# Patient Record
Sex: MALE | Race: WHITE | NOT HISPANIC OR LATINO | Employment: OTHER | RURAL
[De-identification: names, ages, dates, MRNs, and addresses within clinical notes are randomized per-mention and may not be internally consistent; named-entity substitution may affect disease eponyms.]

---

## 2017-05-10 ENCOUNTER — OFFICE VISIT (OUTPATIENT)
Dept: CARDIOLOGY | Facility: CLINIC | Age: 72
End: 2017-05-10

## 2017-05-10 VITALS
SYSTOLIC BLOOD PRESSURE: 148 MMHG | HEART RATE: 64 BPM | BODY MASS INDEX: 28.42 KG/M2 | HEIGHT: 71 IN | WEIGHT: 203 LBS | DIASTOLIC BLOOD PRESSURE: 78 MMHG

## 2017-05-10 DIAGNOSIS — E78.49 OTHER HYPERLIPIDEMIA: Primary | ICD-10-CM

## 2017-05-10 DIAGNOSIS — J44.9 COPD MIXED TYPE (HCC): ICD-10-CM

## 2017-05-10 DIAGNOSIS — I48.20 CHRONIC ATRIAL FIBRILLATION (HCC): ICD-10-CM

## 2017-05-10 DIAGNOSIS — I10 ESSENTIAL HYPERTENSION: ICD-10-CM

## 2017-05-10 DIAGNOSIS — Z72.0 TOBACCO ABUSE: ICD-10-CM

## 2017-05-10 PROBLEM — E78.5 HYPERLIPEMIA: Status: ACTIVE | Noted: 2017-05-10

## 2017-05-10 PROCEDURE — 93000 ELECTROCARDIOGRAM COMPLETE: CPT | Performed by: NURSE PRACTITIONER

## 2017-05-10 PROCEDURE — 99406 BEHAV CHNG SMOKING 3-10 MIN: CPT | Performed by: NURSE PRACTITIONER

## 2017-05-10 PROCEDURE — 99214 OFFICE O/P EST MOD 30 MIN: CPT | Performed by: NURSE PRACTITIONER

## 2017-05-10 RX ORDER — AMLODIPINE BESYLATE 5 MG/1
5 TABLET ORAL DAILY
COMMUNITY
End: 2020-01-01

## 2017-05-10 RX ORDER — ALFUZOSIN HYDROCHLORIDE 10 MG/1
10 TABLET, EXTENDED RELEASE ORAL DAILY
COMMUNITY
End: 2017-08-25

## 2017-05-10 RX ORDER — HYDROCHLOROTHIAZIDE 12.5 MG/1
12.5 TABLET ORAL DAILY
COMMUNITY
End: 2018-02-28 | Stop reason: ALTCHOICE

## 2017-05-10 RX ORDER — CHOLECALCIFEROL (VITAMIN D3) 50 MCG
2000 TABLET ORAL DAILY
COMMUNITY

## 2017-06-20 ENCOUNTER — TELEPHONE (OUTPATIENT)
Dept: CARDIOLOGY | Facility: CLINIC | Age: 72
End: 2017-06-20

## 2017-06-20 NOTE — TELEPHONE ENCOUNTER
Patient called stating it was mentioned to him at last office visit by Dr. Cook about scheduling a stress test either with [facility] or [facility] secondary to persisting SOA.  He seen you, but not sure if Dr. SUBRAMANIAN talked to them as they were leaving.  It looks like you did not have stress report at time of appt.    He had Lexiscan on 5/19/16-showed normal LV function, inferior, inferoseptal, and inferolateral wall infarct with karla-infarct ischemia.  Imdur added and cath if symptoms of SOB persist.  (The report is the cardiology tab 3rd report for 5/19/16...)    Does he need to repeat stress or does he need a cath?  He is still c/o SOA.

## 2017-06-21 NOTE — TELEPHONE ENCOUNTER
I spoke with Krista Payton.  Aware of recommendations.  Patient willing to proceed with cardiac catheterization.  Scheduled for 7/13/17.  Krista aware of patient's instructions including holding Xarelto 2 days prior to cath.

## 2017-07-13 ENCOUNTER — OUTSIDE FACILITY SERVICE (OUTPATIENT)
Dept: CARDIOLOGY | Facility: CLINIC | Age: 72
End: 2017-07-13

## 2017-07-13 PROCEDURE — 93459 L HRT ART/GRFT ANGIO: CPT | Performed by: INTERNAL MEDICINE

## 2017-08-25 ENCOUNTER — OFFICE VISIT (OUTPATIENT)
Dept: CARDIOLOGY | Facility: CLINIC | Age: 72
End: 2017-08-25

## 2017-08-25 VITALS
WEIGHT: 186 LBS | HEIGHT: 71 IN | DIASTOLIC BLOOD PRESSURE: 60 MMHG | HEART RATE: 40 BPM | BODY MASS INDEX: 26.04 KG/M2 | SYSTOLIC BLOOD PRESSURE: 90 MMHG

## 2017-08-25 DIAGNOSIS — R42 DIZZINESS: ICD-10-CM

## 2017-08-25 DIAGNOSIS — I25.10 CORONARY ARTERY DISEASE INVOLVING NATIVE CORONARY ARTERY OF NATIVE HEART WITHOUT ANGINA PECTORIS: Primary | ICD-10-CM

## 2017-08-25 DIAGNOSIS — I10 ESSENTIAL HYPERTENSION: ICD-10-CM

## 2017-08-25 DIAGNOSIS — I48.20 CHRONIC ATRIAL FIBRILLATION (HCC): ICD-10-CM

## 2017-08-25 DIAGNOSIS — J44.9 COPD MIXED TYPE (HCC): ICD-10-CM

## 2017-08-25 DIAGNOSIS — R53.83 OTHER FATIGUE: ICD-10-CM

## 2017-08-25 DIAGNOSIS — I48.0 PAF (PAROXYSMAL ATRIAL FIBRILLATION) (HCC): ICD-10-CM

## 2017-08-25 DIAGNOSIS — Z72.0 TOBACCO ABUSE: ICD-10-CM

## 2017-08-25 DIAGNOSIS — E78.49 OTHER HYPERLIPIDEMIA: ICD-10-CM

## 2017-08-25 PROCEDURE — 93000 ELECTROCARDIOGRAM COMPLETE: CPT | Performed by: NURSE PRACTITIONER

## 2017-08-25 PROCEDURE — 99406 BEHAV CHNG SMOKING 3-10 MIN: CPT | Performed by: NURSE PRACTITIONER

## 2017-08-25 PROCEDURE — 99214 OFFICE O/P EST MOD 30 MIN: CPT | Performed by: NURSE PRACTITIONER

## 2017-08-25 RX ORDER — FOLIC ACID 0.8 MG
TABLET ORAL DAILY
COMMUNITY
End: 2018-02-28 | Stop reason: ALTCHOICE

## 2017-08-25 RX ORDER — CHOLECALCIFEROL (VITAMIN D3) 125 MCG
5 CAPSULE ORAL
COMMUNITY
End: 2018-02-28 | Stop reason: ALTCHOICE

## 2017-08-25 RX ORDER — LISINOPRIL 20 MG/1
20 TABLET ORAL DAILY
Qty: 30 TABLET | Refills: 8 | Status: SHIPPED | OUTPATIENT
Start: 2017-08-25 | End: 2020-01-01 | Stop reason: DRUGHIGH

## 2017-08-25 RX ORDER — ALFUZOSIN HYDROCHLORIDE 10 MG/1
10 TABLET, EXTENDED RELEASE ORAL DAILY PRN
COMMUNITY
End: 2020-01-01

## 2017-08-25 RX ORDER — METOPROLOL TARTRATE 50 MG/1
50 TABLET, FILM COATED ORAL 2 TIMES DAILY
Qty: 180 TABLET | Refills: 3 | Status: SHIPPED | OUTPATIENT
Start: 2017-08-25 | End: 2017-08-30 | Stop reason: ALTCHOICE

## 2017-08-25 RX ORDER — ROFLUMILAST 500 UG/1
TABLET ORAL DAILY
COMMUNITY
End: 2018-02-28 | Stop reason: ALTCHOICE

## 2017-08-25 RX ORDER — PLANT STANOL ESTER 450 MG
TABLET ORAL DAILY
COMMUNITY
End: 2018-02-28 | Stop reason: ALTCHOICE

## 2017-08-25 NOTE — PROGRESS NOTES
"Chief Complaint   Patient presents with   • Hospital Follow-up     Had cath on 07/13/17 with recommendations for medical management. No longer sees Dr. Higgins, [facility]. Will have labs per [facility] today. All refills per [facility]. Brought log of BPs. Has lost 17 pounds since last vist- reports having no appetite.    • Dizziness     When he stands up fast he feels very lightheaded and feels like he is going to pass out. Also, when he lays down he feels \"foggy\".    • Shortness of Breath     About the same as before.    • Fatigue     Doesn't feel like he has any energy anymore.        Cardiac Complaints  Dizziness and fatigue      Subjective   Philip Payton is a 72 y.o. male HTN, COPD, and  IHD diagnosed in 2010 and he had bypass surgery.  He also has PAF for which he is managed with beta blockers and xarelto. His last cardiac workup in 2014 showed mild to moderate PHT. He apparently then had a [facility] test [facility] last year, and was told it was normal, but no report was available at last visit..  He then returned for follow up and reported increasing shortness of breath and fatigue, most recent echo with [facility] in April of 2017 showed normal EF, mild to moderate pulm HTN, and only mild MR.  CT of chest  was reported as normal.  At last visit, repeat stress was discussed but he said that he thought [facility] might be doing. Patient then later called to schedule stress after it was discussed at last visit, but reports from stress testing in 2016 showed inferior wall infarct and karla-infarct ischemia.  Cath was later advised.  Cardiac cath showed previous grafts from LIMA to LAD and SVG to PDA as patent. He did have some stenosis of to the mid portion of RCA at 70% but SVG supplies distal portion of the vessel, medical management advised.  He returns today for follow up and states he has been having a great deal more fatigue than prior, he states his energy is just declining.  Appetite has decreased and he states he " just often does not eat as he is not hungry.  Patient reports following with [facility] today for lab management as well as possible CT and to discuss recent weight loss. He also reports if he stands too quickly he will become off balance and light headed and often has to hold onto something to brace himself.   He does report that he is continuing to smoke about a pack and 1/2 of cigarettes a day.         Cardiac History  Past Surgical History:   Procedure Laterality Date   • CARDIAC CATHETERIZATION  08/31/2010    99% LAD, 75% RCA, 30% LM   • CARDIAC CATHETERIZATION  07/13/2017    Patent Grafts   • CARDIOVASCULAR STRESS TEST  07/16/2012    L. Cardiolyte ([facility] Dr. Bailey) Negative   • CARDIOVASCULAR STRESS TEST  10/2016    @[facility]. Lexican Negative as per Pt   • CARDIOVASCULAR STRESS TEST  05/19/2016    EF 65%, inferoseptal and inferolateral infarct, karla-infarct ischemia, add imdur   • CORONARY ARTERY BYPASS GRAFT  09/02/2010    LIMA-LAD, SVG-PDA, MAZE & LA appendage closure    • ECHO - CONVERTED  03/04/2008    ([facility]) EF 78%   • ECHO - CONVERTED  07/27/2010    ([facility]) EF <35%. Afib   • ECHO - CONVERTED  05/02/2014    ([facility]) EF 50-55%, mild to mod TR, RVSP 50-60mmHg, LV diastolic dysfunction   • ECHO - CONVERTED  04/17/2017    @[facility]:  EF 50-55%, mild MR, no effusion, RSVP 50-60mmHG   • US CAROTID UNILATERAL  09/01/2010    ([facility]) WNL       Current Outpatient Prescriptions   Medication Sig Dispense Refill   • albuterol (PROVENTIL HFA;VENTOLIN HFA) 108 (90 BASE) MCG/ACT inhaler Inhale 2 puffs Every 6 (Six) Hours As Needed for wheezing.     • alfuzosin (UROXATRAL) 10 MG 24 hr tablet Take 10 mg by mouth Daily.     • amLODIPine (NORVASC) 5 MG tablet Take 5 mg by mouth Daily.     • aspirin 81 MG EC tablet Take 81 mg by mouth Daily.     • atorvastatin (LIPITOR) 80 MG tablet 1/2 tablet in evening     • Cholecalciferol (VITAMIN D) 2000 UNITS tablet Take 2,000 Units by mouth Daily.     • FLUTICASONE FUROATE IN Inhale Daily.     •  hydrochlorothiazide (HYDRODIURIL) 12.5 MG tablet Take 12.5 mg by mouth Daily.     • isosorbide mononitrate (IMDUR) 30 MG 24 hr tablet TAKE ONE TABLET BY MOUTH EVERY DAY 30 tablet 4   • Magnesium 500 MG capsule Take  by mouth Daily.     • melatonin 5 MG tablet tablet Take 5 mg by mouth.     • nitroglycerin (NITROSTAT) 0.4 MG SL tablet Place 0.4 mg under the tongue Every 5 (Five) Minutes As Needed for chest pain. Take no more than 3 doses in 15 minutes.     • pantoprazole (PROTONIX) 40 MG EC tablet Take 40 mg by mouth Daily.     • Potassium Gluconate 550 (90 K) MG tablet Take  by mouth Daily.     • rivaroxaban (XARELTO) 20 MG tablet Take 20 mg by mouth Daily.     • roflumilast (DALIRESP) 500 MCG tablet tablet Take  by mouth Daily.     • vitamin B-12 (CYANOCOBALAMIN) 1000 MCG tablet Take 1,000 mcg by mouth Daily.     • lisinopril (PRINIVIL,ZESTRIL) 20 MG tablet Take 1 tablet by mouth Daily. 30 tablet 8   • metoprolol tartrate (LOPRESSOR) 50 MG tablet Take 1 tablet by mouth 2 (Two) Times a Day. 180 tablet 3     No current facility-administered medications for this visit.        Review of patient's allergies indicates no known allergies.    Past Medical History:   Diagnosis Date   • Atrial fibrillation    • H/O colonoscopy     EGD, S/P esophageal dilatation x 2   • Hypertension        Social History     Social History   • Marital status:      Spouse name: N/A   • Number of children: N/A   • Years of education: N/A     Occupational History   • Not on file.     Social History Main Topics   • Smoking status: Current Every Day Smoker     Packs/day: 1.50   • Smokeless tobacco: Never Used   • Alcohol use Yes      Comment: 5 beers/week   • Drug use: No   • Sexual activity: Not on file     Other Topics Concern   • Not on file     Social History Narrative       Family History   Problem Relation Age of Onset   • No Known Problems Mother    • Cancer Father      liver cancer   • Atrial fibrillation Father    • Heart disease  "Brother      s/p stenting   • Other Brother       S/P stenting, carotid surgery    • Diabetes Sister    • Hypertension Sister        Review of Systems   Constitution: Positive for malaise/fatigue and weight loss. Negative for weakness.   Cardiovascular: Positive for near-syncope. Negative for chest pain, dyspnea on exertion, irregular heartbeat and syncope.   Respiratory: Positive for cough and shortness of breath. Negative for wheezing.    Gastrointestinal: Positive for anorexia. Negative for heartburn and nausea.   Genitourinary: Negative for dysuria, hematuria and hesitancy.   Neurological: Positive for dizziness and light-headedness. Negative for focal weakness.   Psychiatric/Behavioral: Negative for altered mental status and depression.       DiabetesNo  Thyroidnormal    Objective     BP 90/60  Pulse (!) 40  Ht 71\" (180.3 cm)  Wt 186 lb (84.4 kg)  BMI 25.94 kg/m2    Physical Exam   Constitutional: He is oriented to person, place, and time. He appears well-developed and well-nourished.   HENT:   Head: Normocephalic and atraumatic.   Eyes: EOM are normal. Pupils are equal, round, and reactive to light.   Neck: Normal range of motion. Neck supple.   Cardiovascular: Regular rhythm.  Bradycardia present.    Murmur heard.  Pulmonary/Chest: Effort normal. He has rhonchi in the right lower field and the left lower field.   Abdominal: Soft. Bowel sounds are normal.   Musculoskeletal: Normal range of motion.   Neurological: He is alert and oriented to person, place, and time.   Skin: Skin is warm and dry.   Psychiatric: He has a normal mood and affect. His behavior is normal.         ECG 12 Lead  Date/Time: 8/25/2017 12:28 PM  Performed by: MELBA ZAPATA  Authorized by: MELBA ZAPATA   Rhythm: sinus bradycardia  BPM: 40  Clinical impression: abnormal ECG            Assessment/Plan     HR is low today at 40.  At last visit, he was on 50mg of metoprolol BID.  It was not increased at that time, but for some reason " he is now on 100mg BID.  We will advise to decrease the metoprolol back down to 50mg BID.  His blood pressure is low today as well at 90/60, we will encourage to decrease his lisinopril to 20mg once daily and use an additional dose in the evening if needed for HTN a SBP over 160, and DBP over 90.  EKG done today for PAF shows a sinus mirta with rate of 40 and incomplete RBBB and normal QT. Xarelto dosing will be continued as active bleed denied.  Weight shows a recent decline of about 17 pounds since his last visit with us in May.  [facility] today and is set to have labs done.  He thinks he is scheduled for CT but unsure of what body part. Patient reports that he just does not have an appetite anymore and this developed about 3 weeks ago, he just does not get hungry and can often go all day without food. He has unfortunately not been able to quit smoking and is still smoking 1.5 packs a day.  We discussed modalities to quit but he does not think he can at present, we talked about cessation and benefits for greater than 3 minutes. He still does not wear his CPAP at night and was counseled on this as this may be an attributing factor to his fatigue, he states he can not wear it as he is intolerant to it. Patient will return next week for repeat EKG and blood pressure check in [place] after medication changes.  He was encouraged to bring copy of labs at next week's check.  No further cardiac testing is warranted at this time.  6 month follow up will be advised or sooner if needed.      Problems Addressed this Visit        Cardiovascular and Mediastinum    Hyperlipemia    HTN (hypertension)    Relevant Medications    metoprolol tartrate (LOPRESSOR) 50 MG tablet    lisinopril (PRINIVIL,ZESTRIL) 20 MG tablet    Chronic atrial fibrillation    Relevant Medications    metoprolol tartrate (LOPRESSOR) 50 MG tablet    CAD (coronary artery disease) - Primary    Relevant Medications    metoprolol tartrate  (LOPRESSOR) 50 MG tablet       Respiratory    COPD mixed type    Relevant Medications    roflumilast (DALIRESP) 500 MCG tablet tablet       Other    Tobacco abuse      Other Visit Diagnoses     Other fatigue        Dizziness                  Electronically signed by CINDY Wong August 25, 2017 9:31 AM

## 2017-08-30 ENCOUNTER — CLINICAL SUPPORT (OUTPATIENT)
Dept: CARDIOLOGY | Facility: CLINIC | Age: 72
End: 2017-08-30

## 2017-08-30 ENCOUNTER — TREATMENT (OUTPATIENT)
Dept: CARDIOLOGY | Facility: CLINIC | Age: 72
End: 2017-08-30

## 2017-08-30 ENCOUNTER — TELEPHONE (OUTPATIENT)
Dept: CARDIOLOGY | Facility: CLINIC | Age: 72
End: 2017-08-30

## 2017-08-30 DIAGNOSIS — R00.1 BRADYCARDIA: Primary | ICD-10-CM

## 2017-08-30 PROCEDURE — 93000 ELECTROCARDIOGRAM COMPLETE: CPT | Performed by: NURSE PRACTITIONER

## 2017-08-30 NOTE — PROGRESS NOTES
Procedure     ECG 12 Lead  Date/Time: 8/30/2017 9:27 AM  Performed by: MELBA ZAPATA  Authorized by: MELBA ZAPATA   Rhythm: sinus bradycardia  Ectopy: atrial premature contractions  BPM: 56  Clinical impression: abnormal ECG

## 2017-08-30 NOTE — TELEPHONE ENCOUNTER
Patient called and made aware that EKG looked better today and that rate was better. Patient told to continue current medications.

## 2017-09-11 RX ORDER — ISOSORBIDE MONONITRATE 30 MG/1
TABLET, EXTENDED RELEASE ORAL
Qty: 90 TABLET | Refills: 0 | Status: SHIPPED | OUTPATIENT
Start: 2017-09-11 | End: 2017-09-11 | Stop reason: SDUPTHER

## 2017-09-12 RX ORDER — ISOSORBIDE MONONITRATE 30 MG/1
TABLET, EXTENDED RELEASE ORAL
Qty: 30 TABLET | Refills: 8 | Status: SHIPPED | OUTPATIENT
Start: 2017-09-12

## 2017-10-23 ENCOUNTER — TELEPHONE (OUTPATIENT)
Dept: CARDIOLOGY | Facility: CLINIC | Age: 72
End: 2017-10-23

## 2017-10-23 NOTE — TELEPHONE ENCOUNTER
Received fax from [facility] for cardiac clearance for patient to have a colonoscopy and EGD on 11/08/17. Patient is on Xarelto 20 mg and aspirin 81 mg.       Fax [phone]

## 2017-10-23 NOTE — TELEPHONE ENCOUNTER
Received fax from [facility] for cardiac clearance for patient to have a colonoscopy and EGD on 11/08/17. Patient is on Xarelto 20 mg and aspirin 81 mg.         Fax [phone]

## 2018-01-22 RX ORDER — ROFLUMILAST 500 UG/1
TABLET ORAL
Qty: 30 TABLET | Refills: 5 | OUTPATIENT
Start: 2018-01-22

## 2018-01-23 ENCOUNTER — TELEPHONE (OUTPATIENT)
Dept: CARDIOLOGY | Facility: CLINIC | Age: 73
End: 2018-01-23

## 2018-01-23 NOTE — TELEPHONE ENCOUNTER
Patients wife reports [facility] was [facility] and heart rate had been elevated, [facility] increased Metoprolol to 75mg in am and 50mg in pm and if heart rate is over they are to report to office. She reports heart rate yesterday 129 while he was outside working. Advised wife to check heart rate at rest due to heart rate will increase with activity, and to call office with any further problems, wife verbalized understanding and states well as he was walking in house I checked heart rate and it was 80.

## 2018-02-28 ENCOUNTER — OFFICE VISIT (OUTPATIENT)
Dept: CARDIOLOGY | Facility: CLINIC | Age: 73
End: 2018-02-28

## 2018-02-28 VITALS
SYSTOLIC BLOOD PRESSURE: 130 MMHG | WEIGHT: 192 LBS | HEIGHT: 71 IN | BODY MASS INDEX: 26.88 KG/M2 | DIASTOLIC BLOOD PRESSURE: 80 MMHG | HEART RATE: 92 BPM

## 2018-02-28 DIAGNOSIS — I10 ESSENTIAL HYPERTENSION: ICD-10-CM

## 2018-02-28 DIAGNOSIS — R06.02 SHORTNESS OF BREATH: ICD-10-CM

## 2018-02-28 DIAGNOSIS — I25.9 IHD (ISCHEMIC HEART DISEASE): Primary | ICD-10-CM

## 2018-02-28 DIAGNOSIS — E78.00 HYPERCHOLESTEREMIA: ICD-10-CM

## 2018-02-28 DIAGNOSIS — K52.9 COLITIS: ICD-10-CM

## 2018-02-28 DIAGNOSIS — I48.20 CHRONIC ATRIAL FIBRILLATION (HCC): ICD-10-CM

## 2018-02-28 DIAGNOSIS — Z72.0 TOBACCO ABUSE: ICD-10-CM

## 2018-02-28 DIAGNOSIS — J44.9 COPD MIXED TYPE (HCC): ICD-10-CM

## 2018-02-28 PROBLEM — E78.5 HYPERLIPEMIA: Status: RESOLVED | Noted: 2017-05-10 | Resolved: 2018-02-28

## 2018-02-28 PROBLEM — I25.10 CAD (CORONARY ARTERY DISEASE): Status: RESOLVED | Noted: 2017-08-25 | Resolved: 2018-02-28

## 2018-02-28 PROBLEM — J41.0 SIMPLE CHRONIC BRONCHITIS (HCC): Status: ACTIVE | Noted: 2018-02-28

## 2018-02-28 PROBLEM — J41.0 SIMPLE CHRONIC BRONCHITIS (HCC): Status: RESOLVED | Noted: 2018-02-28 | Resolved: 2018-02-28

## 2018-02-28 PROCEDURE — 99213 OFFICE O/P EST LOW 20 MIN: CPT | Performed by: INTERNAL MEDICINE

## 2018-02-28 RX ORDER — METOPROLOL TARTRATE 50 MG/1
75 TABLET, FILM COATED ORAL 2 TIMES DAILY
COMMUNITY
End: 2020-01-01 | Stop reason: ALTCHOICE

## 2020-01-01 ENCOUNTER — OFFICE VISIT (OUTPATIENT)
Dept: CARDIOLOGY | Facility: CLINIC | Age: 75
End: 2020-01-01

## 2020-01-01 ENCOUNTER — TELEPHONE (OUTPATIENT)
Dept: CARDIOLOGY | Facility: CLINIC | Age: 75
End: 2020-01-01

## 2020-01-01 ENCOUNTER — CLINICAL SUPPORT (OUTPATIENT)
Dept: CARDIOLOGY | Facility: CLINIC | Age: 75
End: 2020-01-01

## 2020-01-01 ENCOUNTER — OUTSIDE FACILITY SERVICE (OUTPATIENT)
Dept: CARDIOLOGY | Facility: CLINIC | Age: 75
End: 2020-01-01

## 2020-01-01 VITALS
BODY MASS INDEX: 24.78 KG/M2 | HEART RATE: 73 BPM | DIASTOLIC BLOOD PRESSURE: 52 MMHG | HEIGHT: 71 IN | SYSTOLIC BLOOD PRESSURE: 104 MMHG | WEIGHT: 177 LBS | TEMPERATURE: 98.6 F

## 2020-01-01 DIAGNOSIS — I25.9 IHD (ISCHEMIC HEART DISEASE): Primary | ICD-10-CM

## 2020-01-01 DIAGNOSIS — I49.5 SSS (SICK SINUS SYNDROME) (HCC): Primary | ICD-10-CM

## 2020-01-01 DIAGNOSIS — I49.5 SSS (SICK SINUS SYNDROME) (HCC): ICD-10-CM

## 2020-01-01 DIAGNOSIS — I25.9 IHD (ISCHEMIC HEART DISEASE): ICD-10-CM

## 2020-01-01 DIAGNOSIS — I10 ESSENTIAL HYPERTENSION: ICD-10-CM

## 2020-01-01 DIAGNOSIS — R06.02 SHORTNESS OF BREATH: ICD-10-CM

## 2020-01-01 DIAGNOSIS — E78.00 HYPERCHOLESTEREMIA: ICD-10-CM

## 2020-01-01 DIAGNOSIS — I27.20 PHT (PULMONARY HYPERTENSION) (HCC): ICD-10-CM

## 2020-01-01 DIAGNOSIS — J44.9 COPD MIXED TYPE (HCC): ICD-10-CM

## 2020-01-01 DIAGNOSIS — I48.92 ATRIAL FLUTTER WITH RAPID VENTRICULAR RESPONSE (HCC): Primary | ICD-10-CM

## 2020-01-01 DIAGNOSIS — Z72.0 TOBACCO ABUSE: ICD-10-CM

## 2020-01-01 DIAGNOSIS — I48.92 ATRIAL FLUTTER WITH RAPID VENTRICULAR RESPONSE (HCC): ICD-10-CM

## 2020-01-01 PROCEDURE — 93000 ELECTROCARDIOGRAM COMPLETE: CPT | Performed by: INTERNAL MEDICINE

## 2020-01-01 PROCEDURE — 99214 OFFICE O/P EST MOD 30 MIN: CPT | Performed by: INTERNAL MEDICINE

## 2020-01-01 PROCEDURE — 99223 1ST HOSP IP/OBS HIGH 75: CPT | Performed by: INTERNAL MEDICINE

## 2020-01-01 PROCEDURE — 99232 SBSQ HOSP IP/OBS MODERATE 35: CPT | Performed by: INTERNAL MEDICINE

## 2020-01-01 PROCEDURE — 93288 INTERROG EVL PM/LDLS PM IP: CPT | Performed by: INTERNAL MEDICINE

## 2020-01-01 RX ORDER — SOTALOL HYDROCHLORIDE 120 MG/1
120 TABLET ORAL 2 TIMES DAILY
Qty: 180 TABLET | Refills: 3 | Status: SHIPPED | OUTPATIENT
Start: 2020-01-01

## 2020-01-01 RX ORDER — BUDESONIDE AND FORMOTEROL FUMARATE DIHYDRATE 80; 4.5 UG/1; UG/1
2 AEROSOL RESPIRATORY (INHALATION)
Qty: 1 INHALER | Refills: 12 | Status: SHIPPED | OUTPATIENT
Start: 2020-01-01

## 2020-01-01 RX ORDER — ROSUVASTATIN CALCIUM 40 MG/1
40 TABLET, COATED ORAL DAILY
COMMUNITY

## 2020-01-01 RX ORDER — DILTIAZEM HYDROCHLORIDE 240 MG/1
240 CAPSULE, COATED, EXTENDED RELEASE ORAL DAILY
COMMUNITY

## 2020-01-01 RX ORDER — LISINOPRIL 5 MG/1
5 TABLET ORAL DAILY
Qty: 90 TABLET | Refills: 3 | Status: SHIPPED | OUTPATIENT
Start: 2020-01-01

## 2020-01-01 RX ORDER — DIPHENHYDRAMINE HCL 25 MG
25 TABLET ORAL NIGHTLY PRN
COMMUNITY

## 2020-01-01 RX ORDER — SOTALOL HYDROCHLORIDE 120 MG/1
120 TABLET ORAL 2 TIMES DAILY
Qty: 90 TABLET | Refills: 3 | Status: SHIPPED | OUTPATIENT
Start: 2020-01-01 | End: 2020-01-01 | Stop reason: SDUPTHER

## 2020-01-01 RX ORDER — IPRATROPIUM BROMIDE AND ALBUTEROL SULFATE 2.5; .5 MG/3ML; MG/3ML
3 SOLUTION RESPIRATORY (INHALATION) EVERY 4 HOURS PRN
Qty: 360 ML | Refills: 5 | Status: SHIPPED | OUTPATIENT
Start: 2020-01-01

## 2020-01-01 RX ORDER — MAGNESIUM OXIDE 400 MG/1
400 TABLET ORAL 2 TIMES DAILY
Qty: 60 TABLET | Refills: 0
Start: 2020-01-01

## 2020-01-01 RX ORDER — FUROSEMIDE 40 MG/1
40 TABLET ORAL DAILY
COMMUNITY

## 2020-04-16 NOTE — TELEPHONE ENCOUNTER
[facility] called asking you to review the vitals and med list from their progress note under media. They are concerned that the diltiazem and metoprolol are not controlling pulse. Asking for your imput.

## 2020-04-17 NOTE — TELEPHONE ENCOUNTER
Spoke with pt's wife, she didn't want to make any adjustments in medication.  States pt is feeling well. She is asking me to call [facility] and advise of his recommendation and see what they say. Reports they did EKG and thinks he may need an ICD.  Will call [facility] and advise of recommendations.

## 2020-04-22 NOTE — TELEPHONE ENCOUNTER
Spoke with Jose Juan at [facility], he is going to have Carmen, his care coordinator, call me back.

## 2020-04-22 NOTE — TELEPHONE ENCOUNTER
"Carmen at the [facility] ( [phone]) aware of Dr Cook's recommendation to increase Cardizem to 360 mg daily. Aware that I had spoke with pt's wife but she didn't want pt to change any medication,  she just wanted me to \"send recommendation to [facility] and let them decide\".   "

## 2020-05-15 NOTE — TELEPHONE ENCOUNTER
Jessika Mota NP at [facility] called.  Pt is still having some problems. Asking if we can see him sooner than the 5/21/20 appointment. Reports he is at [facility] today. Advised her that I will call him Monday to check on him and bring him on in earlier if our provider feels necessary.

## 2020-05-18 NOTE — TELEPHONE ENCOUNTER
I called pt's wife, spoke at length with her. She is wanting to move pt's appointment out about a couple months.  Reports he saw a cardiologist at [facility] Friday and they are planning an EP study soon. I did advise her to make sure he would not need the appointment on 5/21/20 before cancelling. She said [facility] at [facility] told them there was no need to follow up here before the procedure. We rescheduled his appointment until 7/21/20, she said pt insisted on seeing Dr Cook and not a NP.

## 2020-07-14 NOTE — TELEPHONE ENCOUNTER
[facility] CALLED WANTED TO SCHEDULE PATIENT FOR A FOLLOW UP IN 10-14 DAYS. HE HAS AN APPT  WITH YOU ON 07/21/2020 @ 1:45. DO YOU WANT HIM TO KEEP THIS APPT OR MOVE IT OUT A COUPLE WEEKS?

## 2020-07-15 NOTE — TELEPHONE ENCOUNTER
CALLED AND SPOKE WITH PATIENTS WIFE SHE IS AWARE WE ARE KEEPING THE APPT ON 07/21/2020 @1:45 WITH DR. CEDILLO AND THAT HE NEEDS AN EKG IN 3-4 DAYS. HE IS SCHEDULED FOR 07/17/2020  @10:30 FOR HIS EKG.

## 2020-07-17 PROBLEM — I48.92 ATRIAL FLUTTER WITH RAPID VENTRICULAR RESPONSE (HCC): Status: ACTIVE | Noted: 2020-01-01

## 2020-07-17 NOTE — PROGRESS NOTES
Procedure     ECG 12 Lead  Date/Time: 7/17/2020 1:20 PM  Performed by: Charles Cook MD  Authorized by: Charles Cook MD   Comparison: compared with previous ECG from 4/16/2020  Comparison to previous ECG: Not in Flutter  Rhythm: paced  Rate: normal  Conduction: incomplete right bundle branch block  QRS axis: indeterminate

## 2020-07-20 NOTE — TELEPHONE ENCOUNTER
I spoke with patients wife Krista , made her aware of increase in Sotalol to 120 mg Bid and decrease in Lisinopril  To 5 mg daily. I  Have faxed orders to  [facility] at [phone]  .

## 2020-07-21 PROBLEM — I49.5 SSS (SICK SINUS SYNDROME) (HCC): Status: ACTIVE | Noted: 2020-01-01

## 2020-07-21 PROBLEM — I27.20 PHT (PULMONARY HYPERTENSION) (HCC): Status: ACTIVE | Noted: 2020-01-01

## 2020-07-21 NOTE — PROGRESS NOTES
Chief Complaint   Patient presents with   • Hospital follow up     reports still having some SOB since discharge.    • Atrial Fibrillation     after EKG Sotalol recently increased to 120 mg BID,( has had 3 doses of the increased dose)  BP was 100/60, lisinopril was decreased to 5 mg , [facility].    • PCP     [facility], wilda to add Jessika Mota's to PCP list.   • Med Refill     [facility].        CARDIAC COMPLAINTS  dyspnea and fatigue        Subjective   Philip Payton is a 75 y.o. male came in today for his hospital follow-up visit.  He has history of ischemic heart disease diagnosed in 2010 when he underwent bypass surgery.  In 2017 found to have patent stents.  He also has atrial fibrillation for which he has undergone maze procedure and left atrial appendage closure during the bypass surgery.  He has been in the hospital twice with shortness of breath.  [facility] had [facility] EP study and ablation of the flutter few months ago.  This time he came into the hospital secondary to a fall developed a pneumothorax followed by a hemothorax.  He had a chest tube placed at [facility] and was transferred here.  He then had chest tube replaced with thoracoscopy.  He also had a biopsy of the lung mass.  Found out the pathology report shows mostly inflammatory cells.  No malignant cells were seen.  During his stay in the hospital he had multiple runs of narrow complex tachycardia which was treated with Lopressor.  He also had long pauses.  He finally had a [place] pacemaker placed by Dr. Buchanan.  After which he was started on sotalol and slowly the dose was increased.  His ACE inhibitor's were slowly reduced.  His amlodipine was stopped and Cardizem was added.  He came today stating that he still continues to have some shortness of breath.  He does use oxygen but not 24/7.  [facility]              Cardiac History  Past Surgical History:   Procedure Laterality Date   • CARDIAC CATHETERIZATION  08/31/2010    99% LAD, 75% RCA, 30% LM   • CARDIAC CATHETERIZATION  07/13/2017    Patent Grafts   • CARDIOVASCULAR STRESS TEST  07/16/2012    L. Cardiolyte ([facility] Dr. Bailey) Negative   • CARDIOVASCULAR STRESS TEST  10/2016    @[facility]. Lexican Negative as per Pt   • CARDIOVASCULAR STRESS TEST  05/19/2016    EF 65%, inferoseptal and inferolateral infarct, karla-infarct ischemia, add imdur   • CARDIOVASCULAR STRESS TEST  04/04/2020    @ [facility]. - Lexiscan- EF 55%. negative   • CORONARY ARTERY BYPASS GRAFT  09/02/2010    LIMA-LAD, SVG-PDA, MAZE & LA appendage closure    • ECHO - CONVERTED  03/04/2008    ([facility] CVA) EF 78%   • ECHO - CONVERTED  07/27/2010    ([facility]) EF <35%. Afib   • ECHO - CONVERTED  05/02/2014    ([facility]) EF 50-55%, mild to mod TR, RVSP 50-60mmHg, LV diastolic dysfunction   • ECHO - CONVERTED  04/17/2017    @[facility]:  EF 50-55%, mild MR, no effusion, RSVP 50-60mmHG   • ECHO - CONVERTED  04/04/2020    @ [facility]. - EF 55%. RVSP- 61 mmHg. Trace- Mild MR   • EP STUDY  06/03/2020    @ [facility]- Abalation of the A.Flutter   • PACEMAKER IMPLANTATION  07/13/2020    [place] by Dr. Buchanan   • US CAROTID UNILATERAL  09/01/2010    ([facility]) WNL       Current Outpatient Medications   Medication Sig Dispense Refill   • albuterol (PROVENTIL HFA;VENTOLIN HFA) 108 (90 BASE) MCG/ACT inhaler Inhale 2 puffs Every 6 (Six) Hours As Needed for wheezing.     • aspirin 81 MG EC tablet Take 81 mg by mouth Daily.     • BUDESONIDE PO Take 3 mg by mouth Daily.     • Cholecalciferol (VITAMIN D) 2000 UNITS tablet Take 2,000 Units by mouth Daily.     • dilTIAZem CD (CARDIZEM CD) 240 MG 24 hr capsule Take 240 mg by mouth Daily.     • diphenhydrAMINE (BENADRYL) 25 MG tablet Take 25 mg by mouth At Night As Needed for Itching.     • FLUTICASONE FUROATE IN Inhale Daily.     • furosemide (LASIX) 40 MG tablet Take 40 mg by mouth Daily.     •  isosorbide mononitrate (IMDUR) 30 MG 24 hr tablet TAKE ONE TABLET BY MOUTH EVERY DAY 30 tablet 8   • lisinopril (PRINIVIL,ZESTRIL) 5 MG tablet Take 1 tablet by mouth Daily. 90 tablet 3   • nitroglycerin (NITROSTAT) 0.4 MG SL tablet Place 0.4 mg under the tongue Every 5 (Five) Minutes As Needed for chest pain. Take no more than 3 doses in 15 minutes.     • pantoprazole (PROTONIX) 40 MG EC tablet Take 40 mg by mouth Daily.     • rivaroxaban (XARELTO) 20 MG tablet Take 20 mg by mouth Daily.     • rosuvastatin (CRESTOR) 40 MG tablet Take 40 mg by mouth Daily.     • sotalol (Betapace) 120 MG tablet Take 1 tablet by mouth 2 (Two) Times a Day. 90 tablet 3   • tiotropium (SPIRIVA) 18 MCG per inhalation capsule Place 1 capsule into inhaler and inhale Daily.     • vitamin B-12 (CYANOCOBALAMIN) 1000 MCG tablet Take 1,000 mcg by mouth Daily.       No current facility-administered medications for this visit.        Allergies  :  Patient has no known allergies.       Past Medical History:   Diagnosis Date   • Atrial fibrillation (CMS/HCC)    • H/O colonoscopy     EGD, S/P esophageal dilatation x 2   • Hypertension        Social History     Socioeconomic History   • Marital status:      Spouse name: Not on file   • Number of children: Not on file   • Years of education: Not on file   • Highest education level: Not on file   Tobacco Use   • Smoking status: Current Every Day Smoker     Packs/day: 0.25   • Smokeless tobacco: Never Used   • Tobacco comment: pt is down to 5 cigarettes a day   Substance and Sexual Activity   • Alcohol use: Yes     Comment: 5 beers/week   • Drug use: No       Family History   Problem Relation Age of Onset   • No Known Problems Mother    • Cancer Father         liver cancer   • Atrial fibrillation Father    • Heart disease Brother         s/p stenting   • Other Brother          S/P stenting, carotid surgery    • Diabetes Sister    • Hypertension Sister        Review of Systems   Constitution:  "Positive for malaise/fatigue. Negative for decreased appetite.   HENT: Negative for congestion and sore throat.    Eyes: Negative for blurred vision.   Cardiovascular: Positive for dyspnea on exertion. Negative for chest pain.   Respiratory: Negative for shortness of breath and snoring.    Endocrine: Negative for cold intolerance and heat intolerance.   Hematologic/Lymphatic: Negative for adenopathy. Does not bruise/bleed easily.   Skin: Negative for itching, nail changes and skin cancer.   Musculoskeletal: Negative for arthritis and myalgias.   Gastrointestinal: Negative for abdominal pain, dysphagia and heartburn.   Genitourinary: Negative for bladder incontinence and frequency.   Neurological: Negative for dizziness, light-headedness, seizures and vertigo.   Psychiatric/Behavioral: Negative for altered mental status.   Allergic/Immunologic: Negative for environmental allergies and hives.       Diabetes- No  Thyroid- normal    Objective     /52   Pulse 73   Temp 98.6 °F (37 °C)   Ht 180.3 cm (71\")   Wt 80.3 kg (177 lb)   BMI 24.69 kg/m²     Physical Exam   Constitutional: He is oriented to person, place, and time. He appears well-developed and well-nourished.   HENT:   Head: Normocephalic.   Nose: Nose normal.   Eyes: Pupils are equal, round, and reactive to light. EOM are normal.   Neck: Normal range of motion. Neck supple.   Cardiovascular: Normal rate, regular rhythm, S1 normal and S2 normal.  Occasional extrasystoles are present.   Murmur heard.  Pulmonary/Chest: Effort normal. He has decreased breath sounds in the right middle field and the right lower field.   Abdominal: Soft. Bowel sounds are normal.   Musculoskeletal: Normal range of motion. He exhibits no edema.   Neurological: He is alert and oriented to person, place, and time.   Skin: Skin is warm and dry.   Psychiatric: He has a normal mood and affect.       ECG 12 Lead  Date/Time: 7/21/2020 4:47 PM  Performed by: Charles Cook" MD  Authorized by: Charles Cook MD   Comparison: compared with previous ECG from 7/17/2020  Comparison to previous ECG: No more flutter  Rhythm: paced  Ectopy: atrial premature contractions  Rate: normal  Conduction: incomplete right bundle branch block  QRS axis: indeterminate  Other findings: non-specific ST-T wave changes    Clinical impression: abnormal EKG                    Assessment/Plan   Patient's Body mass index is 24.69 kg/m². BMI is within normal parameters. No follow-up required..     Philip was seen today for hospital follow up, atrial fibrillation, pcp and med refill.    Diagnoses and all orders for this visit:    IHD (ischemic heart disease)  -     ECG 12 Lead    Essential hypertension    Hypercholesteremia    Atrial flutter with rapid ventricular response (CMS/HCC)  -     ECG 12 Lead    COPD mixed type (CMS/HCC)    Shortness of breath    Tobacco abuse    PHT (pulmonary hypertension) (CMS/HCC)    SSS (sick sinus syndrome) (CMS/HCC)    At baseline his heart rate is better stable his blood pressure is lower limit of normal.  His EKG shows atrial pacing with occasional PAC's.  His QTC is still within normal limit.  His clinical examination reveals BMI of 25.  He does have slightly loud second heart sound and a short systolic murmur at the mitral area.  He does have mildly diminished air entry on the right side.    Regarding his ischemic heart disease, he had a negative stress test few months ago.  At this time I do not think we need more work-up.  Continue his aspirin along with Imdur.    Regarding his hypertension, he seems to be tolerating a low-dose of ACE inhibitors along with the Cardizem CD.  Continue the same    Regarding his hypercholesterolemia, his LDL was very well controlled when checked at the hospital.  Continue Crestor at 40 mg dose.    Regarding the atrial flutter, he is now taking high dose of Betapace and tolerating it well.  He is also getting Xarelto.  Continue the  same    Regarding his sick sinus syndrome, he is now has a pacemaker and is functioning well.    Regarding his shortness of breath, it is secondary to his COPD.  [facility] and will see whether they can provide him with Armond as well as Juanjo.  It might help    Regarding his smoking, he has cut down his smoking to about 3 to 4 cigarettes a day and is trying his best to completely quit.  I encouraged him to continue to do so.    I scheduled him to have his pacemaker interrogated.  I will see him back in 3 months or sooner if needed.                    Electronically signed by Charles Cook MD July 21, 2020 16:48

## 2020-07-24 NOTE — TELEPHONE ENCOUNTER
[facility] word from [facility] that patient does not meet their criteria for Brovana.    Alternatives are:    Symbicort  Spiriva  Stiolto  Asmanex   Striverdi    Formulary Nebs:    Ipratropium  Albuterol  Ipratropium/Albuterol    Which do you want to try? Dose/Directions please

## 2020-08-04 NOTE — TELEPHONE ENCOUNTER
[facility]. Confirmed the verbal order for the Symbicort and Duo-Neb they received.

## 2020-08-06 NOTE — TELEPHONE ENCOUNTER
Pt's wife called, said you had told them you would write script for a portable O2 concentrator. Do you want to order one? If so what is it to be set on?

## 2020-08-28 NOTE — TELEPHONE ENCOUNTER
After reviewing pt's PPM check Dr Cook would like to add Mag ox 400 mg BID. Pt's wife said he had been taking OTC Mag 400 mg daily for about a year, had just never told us. She is going to increase it to BID.

## 2020-09-02 NOTE — TELEPHONE ENCOUNTER
Carmen from [facility] called asking for script to be sent to [facility] care fax [phone]

## 2020-09-28 ENCOUNTER — TELEPHONE (OUTPATIENT)
Dept: CARDIOLOGY | Facility: CLINIC | Age: 75
End: 2020-09-28

## 2020-09-28 NOTE — TELEPHONE ENCOUNTER
"Krista, pt's wife called to let us know patient  on Saturday, \" massive heart attack\".  She wanted to let you know how much he thought of you and how much they appreciated everything you have done for him.   "